# Patient Record
Sex: FEMALE | Race: OTHER
[De-identification: names, ages, dates, MRNs, and addresses within clinical notes are randomized per-mention and may not be internally consistent; named-entity substitution may affect disease eponyms.]

---

## 2018-06-26 ENCOUNTER — HOSPITAL ENCOUNTER (INPATIENT)
Dept: HOSPITAL 54 - ER | Age: 33
LOS: 2 days | Discharge: HOME | DRG: 512 | End: 2018-06-28
Attending: INTERNAL MEDICINE | Admitting: INTERNAL MEDICINE
Payer: COMMERCIAL

## 2018-06-26 VITALS — HEIGHT: 66 IN | WEIGHT: 140 LBS | BODY MASS INDEX: 22.5 KG/M2

## 2018-06-26 DIAGNOSIS — S52.302A: Primary | ICD-10-CM

## 2018-06-26 DIAGNOSIS — S52.202A: ICD-10-CM
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
## 2018-06-26 LAB
ALBUMIN SERPL BCP-MCNC: 4 G/DL (ref 3.4–5)
ALP SERPL-CCNC: 73 U/L (ref 46–116)
ALT SERPL W P-5'-P-CCNC: 23 U/L (ref 12–78)
APTT PPP: 24 SEC (ref 23–34)
AST SERPL W P-5'-P-CCNC: 15 U/L (ref 15–37)
BASOPHILS # BLD AUTO: 0 /CMM (ref 0–0.2)
BASOPHILS NFR BLD AUTO: 0.5 % (ref 0–2)
BILIRUB DIRECT SERPL-MCNC: 0.1 MG/DL (ref 0–0.2)
BILIRUB SERPL-MCNC: 0.4 MG/DL (ref 0.2–1)
BUN SERPL-MCNC: 5 MG/DL (ref 7–18)
CALCIUM SERPL-MCNC: 8.6 MG/DL (ref 8.5–10.1)
CHLORIDE SERPL-SCNC: 105 MMOL/L (ref 98–107)
CO2 SERPL-SCNC: 27 MMOL/L (ref 21–32)
CREAT SERPL-MCNC: 0.7 MG/DL (ref 0.6–1.3)
EOSINOPHIL NFR BLD AUTO: 1.7 % (ref 0–6)
GLUCOSE SERPL-MCNC: 84 MG/DL (ref 74–106)
HCT VFR BLD AUTO: 36 % (ref 33–45)
HGB BLD-MCNC: 12.3 G/DL (ref 11.5–14.8)
INR PPP: 0.95 (ref 0.87–1.13)
LYMPHOCYTES NFR BLD AUTO: 3.6 /CMM (ref 0.8–4.8)
LYMPHOCYTES NFR BLD AUTO: 41.3 % (ref 20–44)
MCH RBC QN AUTO: 32 PG (ref 26–33)
MCHC RBC AUTO-ENTMCNC: 34 G/DL (ref 31–36)
MCV RBC AUTO: 94 FL (ref 82–100)
MONOCYTES NFR BLD AUTO: 0.5 /CMM (ref 0.1–1.3)
MONOCYTES NFR BLD AUTO: 5.6 % (ref 2–12)
NEUTROPHILS # BLD AUTO: 4.4 /CMM (ref 1.8–8.9)
NEUTROPHILS NFR BLD AUTO: 50.9 % (ref 43–81)
PLATELET # BLD AUTO: 351 /CMM (ref 150–450)
POTASSIUM SERPL-SCNC: 3.5 MMOL/L (ref 3.5–5.1)
PROT SERPL-MCNC: 7.4 G/DL (ref 6.4–8.2)
RBC # BLD AUTO: 3.86 MIL/UL (ref 4–5.2)
RDW COEFFICIENT OF VARIATION: 13.3 (ref 11.5–15)
SODIUM SERPL-SCNC: 143 MMOL/L (ref 136–145)
WBC NRBC COR # BLD AUTO: 8.6 K/UL (ref 4.3–11)

## 2018-06-26 PROCEDURE — A4216 STERILE WATER/SALINE, 10 ML: HCPCS

## 2018-06-26 PROCEDURE — A4606 OXYGEN PROBE USED W OXIMETER: HCPCS

## 2018-06-26 PROCEDURE — Z7610: HCPCS
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
## 2018-06-27 VITALS — DIASTOLIC BLOOD PRESSURE: 74 MMHG | SYSTOLIC BLOOD PRESSURE: 138 MMHG

## 2018-06-27 VITALS — DIASTOLIC BLOOD PRESSURE: 94 MMHG | SYSTOLIC BLOOD PRESSURE: 154 MMHG

## 2018-06-27 VITALS — SYSTOLIC BLOOD PRESSURE: 147 MMHG | DIASTOLIC BLOOD PRESSURE: 80 MMHG

## 2018-06-27 VITALS — SYSTOLIC BLOOD PRESSURE: 132 MMHG | DIASTOLIC BLOOD PRESSURE: 78 MMHG

## 2018-06-27 LAB
BASOPHILS # BLD AUTO: 0 /CMM (ref 0–0.2)
BASOPHILS NFR BLD AUTO: 0.3 % (ref 0–2)
BUN SERPL-MCNC: 4 MG/DL (ref 7–18)
CALCIUM SERPL-MCNC: 8.8 MG/DL (ref 8.5–10.1)
CHLORIDE SERPL-SCNC: 104 MMOL/L (ref 98–107)
CO2 SERPL-SCNC: 25 MMOL/L (ref 21–32)
CREAT SERPL-MCNC: 0.6 MG/DL (ref 0.6–1.3)
EOSINOPHIL NFR BLD AUTO: 0.2 % (ref 0–6)
GLUCOSE SERPL-MCNC: 120 MG/DL (ref 74–106)
HCT VFR BLD AUTO: 37 % (ref 33–45)
HGB BLD-MCNC: 12.6 G/DL (ref 11.5–14.8)
INR PPP: 0.96 (ref 0.87–1.13)
LYMPHOCYTES NFR BLD AUTO: 1.6 /CMM (ref 0.8–4.8)
LYMPHOCYTES NFR BLD AUTO: 15.1 % (ref 20–44)
MAGNESIUM SERPL-MCNC: 2 MG/DL (ref 1.8–2.4)
MCH RBC QN AUTO: 33 PG (ref 26–33)
MCHC RBC AUTO-ENTMCNC: 34 G/DL (ref 31–36)
MCV RBC AUTO: 96 FL (ref 82–100)
MONOCYTES NFR BLD AUTO: 0.5 /CMM (ref 0.1–1.3)
MONOCYTES NFR BLD AUTO: 5 % (ref 2–12)
NEUTROPHILS # BLD AUTO: 8.7 /CMM (ref 1.8–8.9)
NEUTROPHILS NFR BLD AUTO: 79.4 % (ref 43–81)
PHOSPHATE SERPL-MCNC: 3.4 MG/DL (ref 2.5–4.9)
PLATELET # BLD AUTO: 341 /CMM (ref 150–450)
POTASSIUM SERPL-SCNC: 3.9 MMOL/L (ref 3.5–5.1)
RBC # BLD AUTO: 3.87 MIL/UL (ref 4–5.2)
RDW COEFFICIENT OF VARIATION: 13.1 (ref 11.5–15)
SODIUM SERPL-SCNC: 138 MMOL/L (ref 136–145)
WBC NRBC COR # BLD AUTO: 10.9 K/UL (ref 4.3–11)

## 2018-06-27 RX ADMIN — HYDROMORPHONE HYDROCHLORIDE PRN MG: 2 INJECTION, SOLUTION INTRAMUSCULAR; INTRAVENOUS; SUBCUTANEOUS at 20:43

## 2018-06-27 RX ADMIN — DEXTROSE AND SODIUM CHLORIDE PRN MLS/HR: 5; 450 INJECTION, SOLUTION INTRAVENOUS at 20:52

## 2018-06-27 RX ADMIN — HYDROMORPHONE HYDROCHLORIDE PRN MG: 2 INJECTION, SOLUTION INTRAMUSCULAR; INTRAVENOUS; SUBCUTANEOUS at 12:24

## 2018-06-27 RX ADMIN — DEXTROSE AND SODIUM CHLORIDE PRN MLS/HR: 5; 450 INJECTION, SOLUTION INTRAVENOUS at 01:08
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
## 2018-06-27 NOTE — NUR
MS RN CLOSING NOTES



PT COMFORTABLY ASLEEP AND EASILY AWAKEN, A/O X 4, MAINTAINS SPLINT LEI IN A COMFORTABLE 
POSITION, NO COMPLAIN OF PAIN AT THIS TIME, IN STABLE CONDITION. TOLERATING ROOM AIR 99% 
RESPIRATION EVEN AND UNLABORED. KEPT CLEAN AND DRY AND COMFORTABLE, ALL NURSING CARE 
RENDERED. NEEDS ATTENDED AND ANTICIPATED, FREQUENT VISUAL CHECK DONE FOR SAFETY EVERY 2 
HOURS. ON LOW BED AT ALL TIMES TO ENSURE SAFETY. SAFE HAZARD FREE ENVIRONMENT PROVIDED. CALL 
LIGHT WITHIN EASY TO REACH. WILL ENDORSE NEXT SHIFT CONTINUITY OF CARE.
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
## 2018-06-27 NOTE — NUR
MS LVN NOTES

C/O NAUSEA AND PAIN . ZOFRAN AND DILUADID IVP GIVEN AS ORDERED. ICE CHIPS ALSO SERVED. 
REPOSITION PT FOR COMFORT. IVF D51/2 NS AT 75ML/HR . KEPT HER WARM AND COMFORTABLE AT ALL 
TIMES. PLACE CALL LIGHT AT REACH. FAMILY REMAINS AT THE BEDSIDE.

## 2018-06-27 NOTE — NUR
MSRN OPENING NOTES.

PT RECEIVED A&0X3, RESTING IN BED. PT WITH MOTHER AT BEDSIDE. PT TOLERATING ROOM AIR WITHOUT 
DISTRESS AND REPORTING CURRENT PAIN MANAGEMENT AS ADEQUATE. PT WITH L ARM IMMOBILIZED, NEURO 
INTACT WITH CAP REFILL <3SECS. PT WITH IVC AT R AC G#22 INTACT AND OPERATIONAL. PT BED IN 
LOWEST LOCKED POSITION WITH HNARDRAILSX2 AND CALL BELL WITHIN REACH. PT BRIEFED ON TODAY'S 
POC AND IS WITHOUT CONCERN OR COMPLAINT AT THIS TIME.

## 2018-06-27 NOTE — NUR
MS LVN NOTES

FAMILY LEFT AND PT RESTING AT THIS TIME, BREATHING EVEN AND NON-LABORED. NOT IN ANY ACUTE 
DISTRESS NOTED. IVF STILL INFUSING. KEPT HER WARM AND COMFORTABLE AT ALL TIMES. PLACE CALL 
AT REACH.

## 2018-06-28 VITALS — SYSTOLIC BLOOD PRESSURE: 133 MMHG | DIASTOLIC BLOOD PRESSURE: 62 MMHG

## 2018-06-28 VITALS — SYSTOLIC BLOOD PRESSURE: 132 MMHG | DIASTOLIC BLOOD PRESSURE: 78 MMHG

## 2018-06-28 VITALS — DIASTOLIC BLOOD PRESSURE: 94 MMHG | SYSTOLIC BLOOD PRESSURE: 143 MMHG

## 2018-06-28 VITALS — DIASTOLIC BLOOD PRESSURE: 80 MMHG | SYSTOLIC BLOOD PRESSURE: 142 MMHG

## 2018-06-28 RX ADMIN — DEXTROSE MONOHYDRATE SCH MLS/HR: 50 INJECTION, SOLUTION INTRAVENOUS at 15:53

## 2018-06-28 RX ADMIN — Medication PRN EACH: at 11:46

## 2018-06-28 RX ADMIN — DEXTROSE MONOHYDRATE SCH MLS/HR: 50 INJECTION, SOLUTION INTRAVENOUS at 09:12

## 2018-06-28 RX ADMIN — Medication PRN EACH: at 15:53

## 2018-06-28 RX ADMIN — DEXTROSE MONOHYDRATE SCH MLS/HR: 50 INJECTION, SOLUTION INTRAVENOUS at 16:23

## 2018-06-28 RX ADMIN — HYDROMORPHONE HYDROCHLORIDE PRN MG: 2 INJECTION, SOLUTION INTRAMUSCULAR; INTRAVENOUS; SUBCUTANEOUS at 08:25

## 2018-06-28 NOTE — NUR
MS LVN CLOSING NOTES

 PT AWAKE AND ALERT WATCHING TV AT THIS TIME. SHE JUST AMBULATE TO THE RESTROOM WITH NO 
SIGNS OF ANY DISCOMFORT NOTED. SHE STILL HAVE SLING ON HER LEFT ARM DRESSING DRY AND INTACT. 
STABLE EFRAIN THE NIGHT AND SLEPT WELL,.KEPT HER WARM AND COMFORTABLE AT ALL TIMES. PLACE CALL 
LIGHT AT REACH. ENDORSE TO AM NURSE FOR CONTINUITY OF CARE.
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
## 2018-06-28 NOTE — NUR
RN INITIAL NOTES:





 PATIENT RESTING IN BED. NONLABORED BREATHING NOTED ON ROOM AIR. DENYING PAIN AT THE MOMENT. 
AOX4. IV SITE ON RIGHT AC  PATENT AND INTACT. BED IN LOWEST LOCKED POSITION. CALL LIGHT 
WITHIN REACH. WILL CONTINUE TO MONITOR

## 2018-06-28 NOTE — NUR
MS LVN NOTES

 PT SLEEPING AT THIS TIME. RESPIRATION EVEN AND NON-LABORED. KEPT HER WARM AND COMFORTABLE 
AT ALL TIMES. PLACE CALL LIGHT AT REACH.